# Patient Record
Sex: FEMALE
[De-identification: names, ages, dates, MRNs, and addresses within clinical notes are randomized per-mention and may not be internally consistent; named-entity substitution may affect disease eponyms.]

---

## 2018-07-12 ENCOUNTER — HOSPITAL ENCOUNTER (EMERGENCY)
Dept: HOSPITAL 14 - H.ER | Age: 43
Discharge: HOME | End: 2018-07-12
Payer: MEDICAID

## 2018-07-12 VITALS
TEMPERATURE: 98 F | OXYGEN SATURATION: 99 % | SYSTOLIC BLOOD PRESSURE: 115 MMHG | DIASTOLIC BLOOD PRESSURE: 68 MMHG | RESPIRATION RATE: 16 BRPM

## 2018-07-12 VITALS — HEART RATE: 85 BPM

## 2018-07-12 DIAGNOSIS — R00.2: Primary | ICD-10-CM

## 2018-07-12 LAB
BUN SERPL-MCNC: 13 [, MG/DL] (ref 7–17)
CALCIUM SERPL-MCNC: 9.1 [, MG/DL] (ref 8.4–10.2)
ERYTHROCYTE [DISTWIDTH] IN BLOOD BY AUTOMATED COUNT: 17.1 [, %] (ref 11.5–14.5)
GFR NON-AFRICAN AMERICAN: > 60 [,]
HGB BLD-MCNC: 10.9 [, G/DL] (ref 12–16)
MCH RBC QN AUTO: 28.4 [, PG] (ref 27–31)
MCHC RBC AUTO-ENTMCNC: 32.8 [, G/DL] (ref 33–37)
MCV RBC AUTO: 86.6 [, FL] (ref 81–99)
PLATELET # BLD: 330 [, K/UL] (ref 130–400)
RBC # BLD AUTO: 3.85 [, MIL/UL] (ref 3.8–5.2)
WBC # BLD AUTO: 7.9 [, K/UL] (ref 4.8–10.8)

## 2018-07-12 NOTE — ED PDOC
HPI: General Adult


Time Seen by Provider: 18 21:02


Chief Complaint (Nursing): Palpitations


History Per: Patient


History/Exam Limitations: no limitations


Onset/Duration Of Symptoms: Waxing/Waning


Current Symptoms Are (Timing): Better


Additional Complaint(s): 


No PMHx presenting with palpitations.  States that it started while she was 

having a conversation with her  about her son that she is very worried 

about.  States that she felt an abnormal sensation rise up over her body like a 

warmth that subsided spontaneously.  She states the second time it happened it 

also occurred with palpitations and she felt discomfort but not pain in her 

left arm.  On arrival she is feeling better.  Denies chest pain or shortness of 

breath.  Denies cardiac history or family history of cardiac disease.  No cough

, fevers, chills.  No recent travel, immobilization, surgery.





PMD: Dr. Archer





Past Medical History


Reviewed: Historical Data, Nursing Documentation, Vital Signs


Vital Signs: 


 Last Vital Signs











Temp  99.6 F   18 20:31


 


Pulse  85   18 21:25


 


Resp  24   18 20:31


 


BP  123/72   18 21:25


 


Pulse Ox  100   18 21:54














- Medical History


PMH: No Chronic Diseases





- Family History


Family History: States: Unknown Family Hx





- Allergies


Allergies/Adverse Reactions: 


 Allergies











Allergy/AdvReac Type Severity Reaction Status Date / Time


 


No Known Allergies Allergy   Verified 18 20:31














Review of Systems


ROS Statement: Except As Marked, All Systems Reviewed And Found Negative


Cardiovascular: Positive for: Palpitations





Physical Exam





- Reviewed


Nursing Documentation Reviewed: Yes


Vital Signs Reviewed: Yes





- Physical Exam


Appears: Positive for: Well, Non-toxic, No Acute Distress


Head Exam: Positive for: ATRAUMATIC, NORMAL INSPECTION, NORMOCEPHALIC


Skin: Positive for: Normal Color, Warm, DRY


Eye Exam: Positive for: EOMI, Normal appearance, PERRL


ENT: Positive for: Normal ENT Inspection


Neck: Positive for: Normal, Painless ROM


Cardiovascular/Chest: Positive for: Regular Rate, Rhythm


Respiratory: Positive for: CNT, Normal Breath Sounds


Gastrointestinal/Abdominal: Positive for: Normal Exam, Soft.  Negative for: 

Tenderness


Back: Positive for: Normal Inspection


Extremity: Positive for: Normal ROM


Neurologic/Psych: Positive for: Alert, CNs II-XII, Oriented.  Negative for: 

Motor/Sensory Deficits





- Laboratory Results


Result Diagrams: 


 18 21:37





 18 21:37





- ECG


ECG: Positive for: Interpreted By Me


ECG Rhythm: Positive for: Normal QRS, Normal ST Segment, Sinus Rhythm


O2 Sat by Pulse Oximetry: 100


Pulse Ox Interpretation: Normal





Medical Decision Making


Medical Decision MakinPM


Patient with no PMHx presenting with palpitations


-very comfortable and well appearing with normal vitals, not tachycardic


-PERC negative, not concerned for PE, patient also not having SOB, hemoptysis 

or other symptoms of PE


-will check troponin to risk stratify patient, however low risk for ACS


-possibly stress related, patient offered anxiolytic but declining at this time


-will continue to monitor





1030PM


Workup negative


-Patient is feeling much better, informed of results


-Will d/c home, will refer to PMD Dr. Archer


-Return precautions given


-Patient discharged in good condition, appearing well, ambulatory





Disposition





- Clinical Impression


Clinical Impression: 


 Palpitations








- Patient ED Disposition


Is Patient to be Admitted: No





- Disposition


Referrals: 


Marcelle Mitchell MD [Family Provider] - 


Disposition: Routine/Home


Disposition Time: 22:37


Condition: STABLE


Instructions:  Palpitations


Forms:  [facility] (English)


Print Language: Albanian

## 2018-07-13 NOTE — RAD
Date of service: 



07/12/2018



HISTORY:

palpitations  



COMPARISON:

No prior.



TECHNIQUE:

Chest PA and lateral



FINDINGS:



LUNGS:

No active pulmonary disease.



PLEURA:

No significant pleural effusion identified. No pneumothorax apparent.



CARDIOVASCULAR:

Normal.



OSSEOUS STRUCTURES:

No significant abnormalities.



VISUALIZED UPPER ABDOMEN:

Normal.



OTHER FINDINGS:

None.



IMPRESSION:

No active disease.

## 2018-07-16 NOTE — CARD
--------------- APPROVED REPORT --------------





Date of service: 07/12/2018



EKG Measurement

Heart [id number]

[id number]

[id number]

[id number]

[id number]



<Conclusion>

Sinus tachycardia

Nonspecific ST abnormality

Abnormal ECG